# Patient Record
Sex: FEMALE | Race: WHITE | NOT HISPANIC OR LATINO | URBAN - METROPOLITAN AREA
[De-identification: names, ages, dates, MRNs, and addresses within clinical notes are randomized per-mention and may not be internally consistent; named-entity substitution may affect disease eponyms.]

---

## 2018-01-31 ENCOUNTER — APPOINTMENT (RX ONLY)
Dept: URBAN - METROPOLITAN AREA CLINIC 349 | Facility: CLINIC | Age: 48
Setting detail: DERMATOLOGY
End: 2018-01-31

## 2018-01-31 DIAGNOSIS — L20.89 OTHER ATOPIC DERMATITIS: ICD-10-CM | Status: INADEQUATELY CONTROLLED

## 2018-01-31 PROBLEM — L20.84 INTRINSIC (ALLERGIC) ECZEMA: Status: ACTIVE | Noted: 2018-01-31

## 2018-01-31 PROBLEM — J45.909 UNSPECIFIED ASTHMA, UNCOMPLICATED: Status: ACTIVE | Noted: 2018-01-31

## 2018-01-31 PROCEDURE — ? COUNSELING

## 2018-01-31 PROCEDURE — ? PRESCRIPTION

## 2018-01-31 PROCEDURE — ? TREATMENT REGIMEN

## 2018-01-31 PROCEDURE — 99242 OFF/OP CONSLTJ NEW/EST SF 20: CPT

## 2018-01-31 RX ORDER — FLUTICASONE PROPIONATE 0.05 MG/G
OINTMENT TOPICAL
Qty: 1 | Refills: 0 | Status: ERX | COMMUNITY
Start: 2018-01-31

## 2018-01-31 RX ADMIN — FLUTICASONE PROPIONATE: 0.05 OINTMENT TOPICAL at 00:00

## 2018-01-31 ASSESSMENT — LOCATION DETAILED DESCRIPTION DERM
LOCATION DETAILED: RIGHT DISTAL PRETIBIAL REGION
LOCATION DETAILED: LEFT DISTAL PRETIBIAL REGION

## 2018-01-31 ASSESSMENT — LOCATION SIMPLE DESCRIPTION DERM
LOCATION SIMPLE: LEFT PRETIBIAL REGION
LOCATION SIMPLE: RIGHT PRETIBIAL REGION

## 2018-01-31 ASSESSMENT — LOCATION ZONE DERM: LOCATION ZONE: LEG

## 2022-03-17 ENCOUNTER — TRANSCRIBE ORDER (OUTPATIENT)
Dept: SCHEDULING | Age: 52
End: 2022-03-17

## 2022-03-17 DIAGNOSIS — Z12.31 ENCOUNTER FOR SCREENING MAMMOGRAM FOR MALIGNANT NEOPLASM OF BREAST: Primary | ICD-10-CM

## 2022-03-17 DIAGNOSIS — Z80.0 FAMILY HISTORY OF MALIGNANT NEOPLASM OF DIGESTIVE ORGAN: Primary | ICD-10-CM

## 2022-03-17 DIAGNOSIS — R09.89 OTHER SPECIFIED SYMPTOMS AND SIGNS INVOLVING THE CIRCULATORY AND RESPIRATORY SYSTEMS: Primary | ICD-10-CM

## 2024-07-22 ENCOUNTER — OFFICE VISIT (OUTPATIENT)
Dept: ENT CLINIC | Age: 54
End: 2024-07-22
Payer: COMMERCIAL

## 2024-07-22 VITALS — RESPIRATION RATE: 16 BRPM | BODY MASS INDEX: 39.27 KG/M2 | WEIGHT: 230 LBS | HEIGHT: 64 IN

## 2024-07-22 DIAGNOSIS — H60.8X3 CHRONIC ECZEMATOUS OTITIS EXTERNA OF BOTH EARS: Primary | ICD-10-CM

## 2024-07-22 DIAGNOSIS — H93.8X3 EAR PRESSURE, BILATERAL: ICD-10-CM

## 2024-07-22 DIAGNOSIS — H61.22 EXCESSIVE EAR WAX, LEFT: ICD-10-CM

## 2024-07-22 PROCEDURE — 69210 REMOVE IMPACTED EAR WAX UNI: CPT | Performed by: STUDENT IN AN ORGANIZED HEALTH CARE EDUCATION/TRAINING PROGRAM

## 2024-07-22 PROCEDURE — 92567 TYMPANOMETRY: CPT | Performed by: STUDENT IN AN ORGANIZED HEALTH CARE EDUCATION/TRAINING PROGRAM

## 2024-07-22 PROCEDURE — 99244 OFF/OP CNSLTJ NEW/EST MOD 40: CPT | Performed by: STUDENT IN AN ORGANIZED HEALTH CARE EDUCATION/TRAINING PROGRAM

## 2024-07-22 RX ORDER — OFLOXACIN 3 MG/ML
SOLUTION AURICULAR (OTIC)
COMMUNITY
Start: 2024-07-14

## 2024-07-22 RX ORDER — CIPROFLOXACIN AND DEXAMETHASONE 3; 1 MG/ML; MG/ML
4 SUSPENSION/ DROPS AURICULAR (OTIC) 2 TIMES DAILY
COMMUNITY
Start: 2024-07-15 | End: 2024-07-22

## 2024-07-22 RX ORDER — LORATADINE 10 MG/1
10 TABLET ORAL
COMMUNITY

## 2024-07-22 RX ORDER — ALBUTEROL SULFATE 90 UG/1
2 AEROSOL, METERED RESPIRATORY (INHALATION) EVERY 4 HOURS PRN
COMMUNITY
Start: 2021-08-19

## 2024-07-22 RX ORDER — SEMAGLUTIDE 0.5 MG/.5ML
0.5 INJECTION, SOLUTION SUBCUTANEOUS
COMMUNITY
Start: 2023-06-01

## 2024-07-22 RX ORDER — BETAMETHASONE DIPROPIONATE 0.5 MG/G
CREAM TOPICAL 2 TIMES DAILY
COMMUNITY
Start: 2023-01-23

## 2024-07-22 RX ORDER — NICOTINE 21 MG/24HR
1 PATCH, TRANSDERMAL 24 HOURS TRANSDERMAL DAILY
COMMUNITY
Start: 2020-11-03

## 2024-07-22 RX ORDER — HYDROCHLOROTHIAZIDE 25 MG/1
25 TABLET ORAL DAILY
COMMUNITY
Start: 2023-04-12

## 2024-07-22 RX ORDER — AMMONIUM LACTATE 5 %
LOTION (GRAM) TOPICAL 2 TIMES DAILY
COMMUNITY
Start: 2022-08-10

## 2024-07-22 RX ORDER — TRIAMCINOLONE ACETONIDE 0.25 MG/G
OINTMENT TOPICAL
Qty: 80 G | Refills: 1 | Status: SHIPPED | OUTPATIENT
Start: 2024-07-22 | End: 2024-07-29

## 2024-07-22 RX ORDER — ONDANSETRON 4 MG/1
8 TABLET, FILM COATED ORAL 2 TIMES DAILY PRN
COMMUNITY
Start: 2023-08-08

## 2024-07-22 RX ORDER — AMOXICILLIN AND CLAVULANATE POTASSIUM 875; 125 MG/1; MG/1
1 TABLET, FILM COATED ORAL 2 TIMES DAILY
COMMUNITY
Start: 2024-07-15 | End: 2024-07-22

## 2024-07-22 RX ORDER — AZELASTINE 1 MG/ML
2 SPRAY, METERED NASAL 2 TIMES DAILY
Qty: 120 ML | Refills: 5 | Status: SHIPPED | OUTPATIENT
Start: 2024-07-22

## 2024-07-22 RX ORDER — DILTIAZEM HYDROCHLORIDE 60 MG/1
TABLET, FILM COATED ORAL
COMMUNITY
Start: 2024-04-30

## 2024-07-22 RX ORDER — IBUPROFEN 200 MG
TABLET ORAL EVERY 8 HOURS PRN
COMMUNITY

## 2024-07-22 ASSESSMENT — ENCOUNTER SYMPTOMS
EYES NEGATIVE: 1
BACK PAIN: 0
COLOR CHANGE: 0
CHEST TIGHTNESS: 0
RESPIRATORY NEGATIVE: 1
VOMITING: 0
GASTROINTESTINAL NEGATIVE: 1
ALLERGIC/IMMUNOLOGIC NEGATIVE: 1
EYE DISCHARGE: 0

## 2024-07-22 NOTE — PROGRESS NOTES
Granulocytes, %  % 58.1   Lymphocytes, %  % 33.9   Monocytes, %  % 8.0   Lymphocytes, Abs  1.18 - 3.74 K/uL 2.30   Monocytes, Abs  0.24 - 0.86 K/uL 0.60   Granulocytes Absolute Count  1.40 - 6.60 K/uL 4.00   TSH with Reflex FT4  Order: [id number]  Component  Ref Range & Units 7/25/22 0900   TSH  0.450 - 4.500 uIU/mL 3.380     ASSESSMENT and PLAN  I will give her triamcinolone ointment to use for her external ears.  I will give her Astelin to use.  She will follow-up as needed.    ICD-10-CM    1. Chronic eczematous otitis externa of both ears  H60.8X3       2. Excessive ear wax, left  H61.22 REMOVE IMPACTED EAR WAX      3. Ear pressure, bilateral  H93.8X3 TYMPANOMETRY              Edmund Harrison MD  7/22/2024  Electronically signed    Note dictated using voice recognition software.  Please excuse any typos.

## 2024-07-28 ENCOUNTER — HOSPITAL ENCOUNTER (EMERGENCY)
Age: 54
Discharge: HOME | End: 2024-07-28
Payer: COMMERCIAL

## 2024-07-28 ENCOUNTER — APPOINTMENT (OUTPATIENT)
Dept: GENERAL RADIOLOGY | Age: 54
End: 2024-07-28
Payer: COMMERCIAL

## 2024-07-28 VITALS
WEIGHT: 230 LBS | HEART RATE: 85 BPM | BODY MASS INDEX: 39.27 KG/M2 | OXYGEN SATURATION: 18 % | HEIGHT: 64 IN | TEMPERATURE: 98 F | SYSTOLIC BLOOD PRESSURE: 150 MMHG | DIASTOLIC BLOOD PRESSURE: 75 MMHG | RESPIRATION RATE: 20 BRPM

## 2024-07-28 DIAGNOSIS — R60.0 BILATERAL LOWER EXTREMITY EDEMA: Primary | ICD-10-CM

## 2024-07-28 LAB
ALBUMIN SERPL-MCNC: 4 G/DL (ref 3.5–5)
ALBUMIN/GLOB SERPL: 1.2 (ref 0.4–1.6)
ALP SERPL-CCNC: 117 U/L (ref 45–117)
ALT SERPL-CCNC: 14 U/L (ref 13–61)
ANION GAP SERPL CALC-SCNC: 12 MMOL/L (ref 9–18)
AST SERPL-CCNC: 15 U/L (ref 15–37)
BASOPHILS # BLD: 0.1 K/UL (ref 0–0.2)
BASOPHILS NFR BLD: 1 % (ref 0–2)
BILIRUB SERPL-MCNC: <0.2 MG/DL (ref 0.2–1.1)
BUN SERPL-MCNC: 16 MG/DL (ref 6–23)
CALCIUM SERPL-MCNC: 9.6 MG/DL (ref 8.3–10.4)
CHLORIDE SERPL-SCNC: 103 MMOL/L (ref 98–107)
CO2 SERPL-SCNC: 26 MMOL/L (ref 21–32)
CREAT SERPL-MCNC: 0.56 MG/DL (ref 0.6–1)
DIFFERENTIAL METHOD BLD: NORMAL
EOSINOPHIL # BLD: 0.4 K/UL (ref 0–0.8)
EOSINOPHIL NFR BLD: 4 % (ref 0.5–7.8)
ERYTHROCYTE [DISTWIDTH] IN BLOOD BY AUTOMATED COUNT: 12.7 % (ref 11.9–14.6)
GLOBULIN SER CALC-MCNC: 3.3 G/DL (ref 2.8–4.5)
GLUCOSE SERPL-MCNC: 89 MG/DL (ref 65–100)
HCT VFR BLD AUTO: 40.2 % (ref 35.8–46.3)
HGB BLD-MCNC: 13.3 G/DL (ref 11.7–15.4)
IMM GRANULOCYTES # BLD AUTO: 0 K/UL (ref 0–0.5)
IMM GRANULOCYTES NFR BLD AUTO: 0 % (ref 0–5)
LYMPHOCYTES # BLD: 3 K/UL (ref 0.5–4.6)
LYMPHOCYTES NFR BLD: 32 % (ref 13–44)
MCH RBC QN AUTO: 31.5 PG (ref 26.1–32.9)
MCHC RBC AUTO-ENTMCNC: 33.1 G/DL (ref 31.4–35)
MCV RBC AUTO: 95.3 FL (ref 82–102)
MONOCYTES # BLD: 0.7 K/UL (ref 0.1–1.3)
MONOCYTES NFR BLD: 7 % (ref 4–12)
NEUTS SEG # BLD: 5.2 K/UL (ref 1.7–8.2)
NEUTS SEG NFR BLD: 56 % (ref 43–78)
NRBC # BLD: 0 K/UL (ref 0–0.2)
NT PRO BNP: 101 PG/ML (ref 0–450)
PLATELET # BLD AUTO: 314 K/UL (ref 150–450)
PMV BLD AUTO: 10.2 FL (ref 9.4–12.3)
POTASSIUM SERPL-SCNC: 3.9 MMOL/L (ref 3.5–5.1)
PROT SERPL-MCNC: 7.3 G/DL (ref 6.4–8.2)
RBC # BLD AUTO: 4.22 M/UL (ref 4.05–5.2)
SODIUM SERPL-SCNC: 141 MMOL/L (ref 133–143)
WBC # BLD AUTO: 9.3 K/UL (ref 4.3–11.1)

## 2024-07-28 PROCEDURE — 80053 COMPREHEN METABOLIC PANEL: CPT

## 2024-07-28 PROCEDURE — 99284 EMERGENCY DEPT VISIT MOD MDM: CPT

## 2024-07-28 PROCEDURE — 85025 COMPLETE CBC W/AUTO DIFF WBC: CPT

## 2024-07-28 PROCEDURE — 83880 ASSAY OF NATRIURETIC PEPTIDE: CPT

## 2024-07-28 PROCEDURE — 71046 X-RAY EXAM CHEST 2 VIEWS: CPT

## 2024-07-28 PROCEDURE — 73630 X-RAY EXAM OF FOOT: CPT

## 2024-07-28 ASSESSMENT — PAIN SCALES - GENERAL: PAINLEVEL_OUTOF10: 4

## 2024-07-28 ASSESSMENT — LIFESTYLE VARIABLES
HOW OFTEN DO YOU HAVE A DRINK CONTAINING ALCOHOL: NEVER
HOW MANY STANDARD DRINKS CONTAINING ALCOHOL DO YOU HAVE ON A TYPICAL DAY: PATIENT DOES NOT DRINK

## 2024-07-28 ASSESSMENT — PAIN DESCRIPTION - LOCATION: LOCATION: LEG

## 2024-07-28 ASSESSMENT — PAIN - FUNCTIONAL ASSESSMENT: PAIN_FUNCTIONAL_ASSESSMENT: 0-10

## 2024-07-28 NOTE — ED PROVIDER NOTES
Emergency Department Provider Note       PCP: Myran Phelps MD   Age: 54 y.o.   Sex: female     DISPOSITION Decision To Discharge 07/28/2024 04:14:56 PM       ICD-10-CM    1. Bilateral lower extremity edema  R60.0           Medical Decision Making     54-year-old female with several month history of bilateral lower extremity swelling it does wax and wane.  Currently on 25 mg of HCTZ.  CBC CMP BNP ER normal chest x-ray is negative x-rays of the left foot show some degenerative changes.  Do not feel any further intervention is needed at this time.  Advised to wear compression stockings when she is at work drink plenty of water increase her activity keep legs elevated while sitting.  Keep follow-up appointments with primary care and upcoming vascular specialties.  Return to ER if any chest pain or shortness of breath     1 or more acute illnesses that pose a threat to life or bodily function.   Shared medical decision making was utilized in creating the patients health plan today.    I independently ordered and reviewed each unique test.  I reviewed external records: provider visit note from PCP.     I interpreted the X-rays 2 view chest negative x-rays of the left foot with degenerative changes as read by radiologist.  I interpreted the CBC CMP BNP.              History     Patient to ER complaining of bilateral lower leg swelling left greater than right for the past several months.  She has seen her primary care physician for this and is currently on HCTZ 25 mg.  She also reports history of left foot arthritis and has seen a foot specialist and had injections in the past the foot will hurt off and on.  She denies any fever or trauma chest pain shortness of breath she sits swelling is better in the mornings and worse in the afternoons.  She is also has been referred to vascular specialist by her primary care physician.  She does work 12-hour shifts.  Patient also has a history of heart murmur primary care  SHORTNESS OF BREATH/COUGH

## 2024-07-28 NOTE — DISCHARGE INSTRUCTIONS
Continue to take daily HCTZ, increase water intake.  You may need to use a light weight support stockings to wear while at work since she works on her feet.  Keep legs elevated while sitting keep any upcoming appointments with specialist.  Return to the ER for any worsening symptoms

## 2024-07-28 NOTE — ED TRIAGE NOTES
Pt ambulatory to triage for reports of leg swelling. Pt states both have been swollen for weeks, reporting 4/10 pain at this time. Pt reports L is worse than the R. Pt denies any long trips, acute shortness of breath, or any hormonal birth control use.